# Patient Record
Sex: MALE | Race: WHITE
[De-identification: names, ages, dates, MRNs, and addresses within clinical notes are randomized per-mention and may not be internally consistent; named-entity substitution may affect disease eponyms.]

---

## 2018-08-15 ENCOUNTER — HOSPITAL ENCOUNTER (EMERGENCY)
Dept: HOSPITAL 74 - JER | Age: 23
LOS: 1 days | Discharge: HOME | End: 2018-08-16
Payer: COMMERCIAL

## 2018-08-15 VITALS — BODY MASS INDEX: 27.3 KG/M2

## 2018-08-15 DIAGNOSIS — F17.210: ICD-10-CM

## 2018-08-15 DIAGNOSIS — K21.9: Primary | ICD-10-CM

## 2018-08-16 VITALS — DIASTOLIC BLOOD PRESSURE: 83 MMHG | TEMPERATURE: 97.6 F | HEART RATE: 63 BPM | SYSTOLIC BLOOD PRESSURE: 123 MMHG

## 2018-08-16 NOTE — PDOC
History of Present Illness





- General


History Source: Patient





- History of Present Illness


Initial Comments: 





08/16/18 00:26


The patient is a 22 year old male, with a significant past medical history, who 

presents to the emergency department with ball like sensation to middle chest 

today. He states the sensation is exacerbated with eating food.  The patient 

admits to ETOH consumption, tobacco smoking, and eating spicy foods. 





The patient shortness of breath, headache and dizziness. The patient  denies 

fever, chills, nausea, vomit, diarrhea and constipation. The patient  denies 

dysuria, frequency, urgency and hematuria. 





Allergies: NKDA


Past surgical history: none reported


Social history: ETOH and Tobacco use reported. No illicit drug use. 








<Joana Reyes - Last Filed: 08/16/18 00:26>





- General


History Source: Patient





<Juvenal Vidal - Last Filed: 08/16/18 01:37>





- General


Chief Complaint: Chest Pain


Stated Complaint: CHEST PAIN


Time Seen by Provider: 08/16/18 00:21





Past History





<Joana Reyes - Last Filed: 08/16/18 00:26>





- Suicide/Smoking/Psychosocial Hx


Smoking History: Current some day smoker


Have you smoked in the past 12 months: Yes


Number of Cigarettes Smoked Daily: 3


Information on smoking cessation initiated: No


Hx Alcohol Use: No


Drug/Substance Use Hx: No





<Juvenal Vidal - Last Filed: 08/16/18 01:37>





- Past Medical History


Allergies/Adverse Reactions: 


 Allergies











Allergy/AdvReac Type Severity Reaction Status Date / Time


 


No Known Allergies Allergy   Verified 08/16/18 00:00











Home Medications: 


Ambulatory Orders





Famotidine [Pepcid] 40 mg PO BID #20 tablet 08/16/18 











**Review of Systems





- Review of Systems


Able to Perform ROS?: Yes


Comments:: 





08/16/18 00:26





CONSTITUTIONAL: 


Absent: fever, chills, diaphoresis, generalized weakness, malaise, loss of 

appetite


HEENT: 


Absent: rhinorrhea, nasal congestion, throat pain, throat swelling, difficulty 

swallowing, mouth swelling, ear pain, eye pain, visual Changes


CARDIOVASCULAR: 


(+) ball-like sensation to chest. Absent: syncope, palpitations, irregular 

heart rate, lightheadedness, peripheral edema


RESPIRATORY: 


Absent: cough, shortness of breath, dyspnea with exertion, orthopnea, wheezing, 

stridor, hemoptysis


GASTROINTESTINAL:


Absent: abdominal pain, abdominal distension, nausea, vomiting, diarrhea, 

constipation, melena, hematochezia


GENITOURINARY: 


Absent: dysuria, frequency, urgency, hesitancy, hematuria, flank pain, genital 

pain


MUSCULOSKELETAL: 


Absent: myalgia, arthralgia, joint swelling


SKIN: 


Absent: rash, itching, pallor


HEMATOLOGIC/IMMUNOLOGIC: 


Absent: easy bleeding, easy bruising, lymphadenopathy, frequent infections


ENDOCRINE:


Absent: unexplained weight gain, unexplained weight loss, heat intolerance, 

cold intolerance


NEUROLOGIC: 


Absent: headache, focal weakness or paresthesias, dizziness, unsteady gait, 

seizure, mental


status changes, bladder or bowel incontinence


PSYCHIATRIC: 


Absent: anxiety, depression, suicidal or homicidal ideation, hallucinations.








<Joana Reyes - Last Filed: 08/16/18 00:26>





*Physical Exam





- Vital Signs


 Last Vital Signs











Temp Pulse Resp BP Pulse Ox


 


 97.6 F   63   20   123/83   100 


 


 08/16/18 00:01  08/16/18 00:01  08/16/18 00:01  08/16/18 00:01  08/16/18 00:01














- Physical Exam


Comments: 


08/16/18 00:26





GENERAL:


Well developed, well nourished. Awake and alert. No acute distress.


HEENT:


Normocephalic, atraumatic. PERRLA, EOMI. No conjunctival pallor. Sclera are non-

icteric. Moist mucous membranes. Oropharynx is clear.


NECK: 


Supple. Full ROM. No JVD. Carotid pulses 2+ and symmetric, without bruits. No 

thyromegaly. No lymphadenopathy.


CARDIOVASCULAR:


Regular rate and rhythm. No murmurs, rubs, or gallops. Distal pulses are 2+ and 

symmetric. 


PULMONARY: 


No evidence of respiratory distress. Lungs clear to auscultation bilaterally. 

No wheezing, rales or rhonchi.


ABDOMINAL:


Soft. Non-tender. Non-distended. No rebound or guarding. No organomegaly. 

Normoactive bowel sounds. 


MUSCULOSKELETAL 


Normal range of motion at all joints. No bony deformities or tenderness. No CVA 

tenderness.


EXTREMITIES: 


No cyanosis. No clubbing. No edema. No calf tenderness.


SKIN: 


Warm and dry. Normal capillary refill. No rashes. No jaundice. 


NEUROLOGICAL: 


Alert, awake, appropriate. Cranial nerves 2-12 intact. Normoreflexic in the 

upper and lower extremities. Normal speech. Toes are down-going bilaterally. 

Gait is normal without ataxia.


PSYCHIATRIC: 


Cooperative. Good eye contact. Appropriate mood and affect.





<Joana Reyes - Last Filed: 08/16/18 00:26>





- Vital Signs


 Last Vital Signs











Temp Pulse Resp BP Pulse Ox


 


 97.6 F   63   20   123/83   100 


 


 08/16/18 00:01  08/16/18 00:01  08/16/18 00:01  08/16/18 00:01  08/16/18 00:01














<Juvenal Vidal - Last Filed: 08/16/18 01:37>





Medical Decision Making





- Medical Decision Making





08/16/18 01:37


Dr. Vidal: The scribe's documentation has been prepared under my direction 

and personally reviewed by me in its entirery. I confirm that the note above 

accurately reflects all work, treatment, procedures, and medical decision 

making performed by me.





<Juvenal Vidal - Last Filed: 08/16/18 01:37>





*DC/Admit/Observation/Transfer





- Attestations


Scribe Attestion: 





08/16/18 00:27





Documentation prepared by Joana Reyes, acting as medical scribe for Juvenal Vidal DO.





<Joana Reyes - Last Filed: 08/16/18 00:26>





- Discharge Dispostion


Decision to Admit order: No





<Juvenal Vidal - Last Filed: 08/16/18 01:37>


Diagnosis at time of Disposition: 


GERD (gastroesophageal reflux disease)


Qualifiers:


 Esophagitis presence: esophagitis presence not specified Qualified Code(s): 

K21.9 - Gastro-esophageal reflux disease without esophagitis








- Discharge Dispostion


Disposition: HOME


Condition at time of disposition: Stable





- Prescriptions


Prescriptions: 


Famotidine [Pepcid] 40 mg PO BID #20 tablet





- Patient Instructions


Printed Discharge Instructions:  DI for Gastroesophageal Reflux Disease (GERD)


Print Language: Pakistani

## 2018-08-29 NOTE — EKG
Test Reason : 

Blood Pressure : ***/*** mmHG

Vent. Rate : 064 BPM     Atrial Rate : 064 BPM

   P-R Int : 158 ms          QRS Dur : 086 ms

    QT Int : 384 ms       P-R-T Axes : 050 061 048 degrees

   QTc Int : 396 ms

 

NORMAL SINUS RHYTHM

EARLY REPOLARIZATION

NORMAL ECG

NO PREVIOUS ECGS AVAILABLE

Confirmed by MARIA E FULLER, STANISLAW ([id number]) on 8/29/2018 3:01:56 PM

 

Referred By:             Confirmed By:STANISLAW SANDERSON MD